# Patient Record
(demographics unavailable — no encounter records)

---

## 2017-03-09 NOTE — ERD
ER Documentation


Chief Complaint


Date/Time


DATE: 3/9/17 


TIME: 15:52


Chief Complaint


constipation; last bm yesterday; no complaint of rectal bleeding





HPI


Is a 38-year-old male who presents to the emergency department today 

complaining of constipation and rectal pain for the past 2 days.  Patient 

states his last bowel movement was 2 days ago.  Denies any nausea vomiting, 

abdominal pain, fevers or chills.  He has not taken any medication for the 

pain.  Denies taking any narcotics currently.





ROS


All systems reviewed and are negative except as per history of present illness.





Medications


Home Meds


Active Scripts


Hydrocortisone Acetate (Anusol-Hc) 25 Mg Supp.rect, 1 SUPP [place] QHS Y for 

HEMORROID PAIN/ITCHING, #12 SUPP.RECT


   Prov:ABI LINN PA-C         3/9/17


Acetaminophen* (Tylophen*) 500 Mg Capsule, 1 CAP PO Q6H Y for PAIN AND OR 

ELEVATED TEMP, #30 CAP


   Prov:ABI LINN PA-C         3/9/17


Naproxen* (Naprosyn*) 500 Mg Tablet, 500 MG PO BID Y for PAIN AND/OR 

INFLAMMATION, #30 TAB


   Prov:ABI LINN PA-C         3/9/17


Polyethylene Glycol* (Miralax*) 17 Gm Powd.pack, 17 GM PO DAILY, #20


   Prov:ABI LINN PA-C         3/9/17


Docusate Sodium* (Colace*) 100 Mg Capsule, 100 MG PO TID, #30 CAP


   Prov:ABI LINN PA-C         3/9/17


Phenol* (Chloraseptic* Spray) 177 Ml [place].pump, 2 SPRAY MT Q2H Y for SORE 

THROAT, #1 BOTTLE


   Prov:DAVID BELL DO         8/13/16


Acetaminophen* (Acetaminophen*) 325 Mg Tablet, 325 MG PO Q4H Y for PAIN AND OR 

ELEVATED TEMP, #20 TAB


   Prov:DAVID BELL DO         8/13/16


Ibuprofen* (Ibuprofen*) 600 Mg Tablet, 600 MG PO Q8, #14 TAB


   Prov:DAVID BELL DO         8/13/16


Loperamide Hcl* (Loperamide Hcl*) 2 Mg Cap, 2 MG PO after diarrhea, #10 CAP


   Prov:DAVID BELL DO         8/13/16


Promethazine w/Codeine* (Phenergan w/Codeine* Syrup) 5 Ml Syrup, 5 ML PO Q4H Y 

for COUGH, #120 ML


   Prov:DAVID BELL DO         8/13/16


Azithromycin* (Zithromax*) 250 Mg Tablet, 250 MG PO .ZPACK AS DIRECTED, #6 TAB


   TAKE 500 MG (2 TABS) THE FIRST DAY THEN 250 MG (1 TAB) DAYS 2-5


   Prov:DAVID BELL DO         8/13/16


Docusate Sodium* (Colace*) 100 Mg Capsule, 100 MG PO TID, #30 CAP


   Prov:MELODY RODRIGUEZ PA-C         1/11/16





Allergies


Allergies:  


Coded Allergies:  


     Penicillins (Verified  Allergy, Unknown, 8/13/16)





PMhx/Soc


History of Surgery:  No (APPENDECTOMY 3 WKS AGO)


Anesthesia Reaction:  No


Hx Neurological Disorder:  No


Hx Respiratory Disorders:  No


Hx Cardiac Disorders:  No


Hx Psychiatric Problems:  No


Hx Miscellaneous Medical Probl:  No


Hx Alcohol Use:  No


Hx Substance Use:  No


Hx Tobacco Use:  No





Physical Exam


Vitals





Vital Signs








  Date Time  Temp Pulse Resp B/P Pulse Ox O2 Delivery O2 Flow Rate FiO2


 


3/9/17 12:44 98.6 89 18 142/88 98   








Physical Exam


Const:     No acute distress


Head:   Atraumatic 


Eyes:    Normal Conjunctiva


ENT:    Normal External Ears, Nose and Mouth.


Neck:               Full range of motion..~ No meningismus.


Resp:    Clear to auscultation bilaterally


Cardio:    Regular rate and rhythm, no murmurs


Abd:    Soft, non tender, non distended. Normal bowel sounds.


:   Rectal exam with evidence of bleeding and external hemorrhoids.  No 

evidence of abscess.


Skin:    No petechiae or rashes


Ext:    No cyanosis, or edema


Neur:    Awake and alert


Psych:    Normal Mood and Affect





Procedures/MDM


This a 38-year-old male who presents to the emergency department today 

complaining of rectal pain and constipation for the past 2 days.  Patient had 

been seen here approximately a little over a year ago for similar complaints 

after he was taking narcotics for an appendectomy.  Patient denies taking any 

narcotics at this time.  Patient has no abdominal pain on physical exam and did 

not feel the patient requires further laboratory workup or imaging at this 

time.  His abdomen is soft and nontender.  He has no nausea or vomiting and 

have low suspicion for obstruction at this time.  Patient did have some 

bleeding on rectal exam and there was evidence of external hemorrhoids.  

Patient was unaware that he had rectal bleeding.  He denied any blood in his 

stool.  There is no evidence of abscess or thrombosed hemorrhoid at this time 

that would require an incision and drainage.  Patient's rectal bleeding likely 

secondary to hemorrhoids and hemorrhoids likely caused by constipation and 

straining.





Patient will be given a prescription for Colace, MiraLAX and Anusol as well as 

Naprosyn and Tylenol for pain





At this time the patient is stable for discharge and outpatient management. 

Patient should follow up with their PCP in the next 1-2 days.  They may return 

to the emergency department sooner for any persistent or worsening of symptoms.

  Patient understood and agreed with the plan.





Departure


Diagnosis:  


 Primary Impression:  


 Constipation


 Constipation type:  unspecified constipation type  Qualified Code:  K59.00 - 

Constipation, unspecified constipation type


 Additional Impression:  


 Hemorrhoids


 Hemorrhoid type:  unspecified  Qualified Code:  K64.9 - Hemorrhoids, 

unspecified hemorrhoid type


Condition:  ABI Villa PA-C Mar 9, 2017 15:56

## 2017-10-13 NOTE — QN
Documentation


Comment


My independent concise history is chest pain and diarrhea.  My pertinent 

physical exam findings are no abnormal findings on physical exam.  The plan is 

laboratory studies, EKG, and chest x-ray were all normal.  The patient will be 

discharged home but will need close follow-up with his primary doctor within 24-

48 hours for reevaluation.  At this point I doubt acute coronary syndrome, 

pneumonia, pneumothorax, pulmonary embolism, or aortic dissection.











CHATO ADAM MD Oct 13, 2017 08:55

## 2017-10-13 NOTE — RADRPT
PROCEDURE:   XR Chest. 

 

CLINICAL INDICATION:    chest pain

 

TECHNIQUE:   Single frontal view of the chest was obtained 

 

COMPARISON:   None 

 

FINDINGS:

The heart and mediastinum are within normal limits.  

The lungs are clear.

There is no pleural effusion or pneumothorax.   

 

RPTAT: AA

 

IMPRESSION:

No acute disease.

_____________________________________________ 

.Andrew Benavidez MD, MD           Date    Time 

Electronically viewed and signed by .Andrew Benavidez MD, MD on 10/13/2017 08:25 

 

D:  10/13/2017 08:25  T:  10/13/2017 08:25

.S/

## 2017-10-13 NOTE — ERD
ER Documentation


Chief Complaint


Date/Time


DATE: 10/13/17 


TIME: 08:17


Chief Complaint


Patient  states he has chest pain and diarrhea since last night





HPI


39-year-old male presenting to the emergency department complaining of 

nonradiating, hot left-sided chest pain that is 7 out of 10 since last night.  

Patient denies any shortness of breath, palpitations.  Denies any medical 

problems or history of heart disease.  Patient also states that he has had 5 

episodes of diarrhea last night.  He denies any fevers, nausea, vomiting.  

Denies any abdominal pain





ROS


All systems reviewed and are negative except as per history of present illness.





Medications


Home Meds


Active Scripts


Acetaminophen* (Tylenol*) 325 Mg Tablet, 2 TAB PO Q6 Y for PAIN AND OR ELEVATED 

TEMP, #20 TAB


   Prov:JEANNE JAIME PA-C         10/13/17


Hydrocortisone Acetate (Anusol-Hc) 25 Mg Supp.rect, 1 SUPP [place] QHS Y for 

HEMORROID PAIN/ITCHING, #12 SUPP.RECT


   Prov:ABI LINN PA-C         3/9/17


Acetaminophen* (Tylophen*) 500 Mg Capsule, 1 CAP PO Q6H Y for PAIN AND OR 

ELEVATED TEMP, #30 CAP


   Prov:ABI LINN PA-C         3/9/17


Naproxen* (Naprosyn*) 500 Mg Tablet, 500 MG PO BID Y for PAIN AND/OR 

INFLAMMATION, #30 TAB


   Prov:ABI LINN PA-C         3/9/17


Polyethylene Glycol* (Miralax*) 17 Gm Powd.pack, 17 GM PO DAILY, #20


   Prov:ABI LINN PA-C         3/9/17


Docusate Sodium* (Colace*) 100 Mg Capsule, 100 MG PO TID, #30 CAP


   Prov:ABI LINN PA-C         3/9/17


Phenol* (Chloraseptic* Spray) 177 Ml [place].pump, 2 SPRAY MT Q2H Y for SORE 

THROAT, #1 BOTTLE


   Prov:DAVID BELL DO         8/13/16


Acetaminophen* (Acetaminophen*) 325 Mg Tablet, 325 MG PO Q4H Y for PAIN AND OR 

ELEVATED TEMP, #20 TAB


   Prov:DAVID BELL DO         8/13/16


Ibuprofen* (Ibuprofen*) 600 Mg Tablet, 600 MG PO Q8, #14 TAB


   Prov:DAVID BELL DO         8/13/16


Loperamide Hcl* (Loperamide Hcl*) 2 Mg Cap, 2 MG PO after diarrhea, #10 CAP


   Prov:DAVID BELL DO         8/13/16


Promethazine w/Codeine* (Phenergan w/Codeine* Syrup) 5 Ml Syrup, 5 ML PO Q4H Y 

for COUGH, #120 ML


   Prov:DAVID BELL DO         8/13/16


Azithromycin* (Zithromax*) 250 Mg Tablet, 250 MG PO .ZPACK AS DIRECTED, #6 TAB


   TAKE 500 MG (2 TABS) THE FIRST DAY THEN 250 MG (1 TAB) DAYS 2-5


   Prov:DAVID BELL DO         8/13/16


Docusate Sodium* (Colace*) 100 Mg Capsule, 100 MG PO TID, #30 CAP


   Prov:MELODY RODRIGUEZ PA-C         1/11/16





Allergies


Allergies:  


Coded Allergies:  


     Penicillins (Verified  Allergy, Unknown, 8/13/16)





PMhx/Soc


History of Surgery:  No (APPENDECTOMY 3 WKS AGO)


Anesthesia Reaction:  No


Hx Neurological Disorder:  No


Hx Respiratory Disorders:  No


Hx Cardiac Disorders:  No


Hx Psychiatric Problems:  No


Hx Miscellaneous Medical Probl:  No


Hx Alcohol Use:  No


Hx Substance Use:  No


Hx Tobacco Use:  No





Physical Exam


Vitals





Vital Signs








  Date Time  Temp Pulse Resp B/P Pulse Ox O2 Delivery O2 Flow Rate FiO2


 


10/13/17 07:18 97.7 88 20 133/97 98   








Physical Exam


GENERAL: no acute distress, non-toxic appearing, sitting up in bed


HENT: normocephalic/atraumatic


EYES: conjunctiva is normal


NECK: no noticeable or palpable swelling, no carotid bruits, no JVD


CARDIOVASCULAR: RRR, good S1S2, no murmurs or gallops heard


PULM: clear to auscultation, no use of accessory muscles, no crackles or 

wheezes. 


ABDOMEN: normal bowel sounds, abdomen soft and nontender 


EXT: no edema, cyanosis or clubbing 


MUSCULOSKELETAL: 5/5 strength, normal range of motion, no swollen or 

erythematous joints. 


NEURO: alert and oriented


SKIN: no rashes, skin warm and dry, no erythematous areas 


BREAST: breast exam was not relevant, therefore not preformed


PSYCH: normal mood and mentation, denies suicidal or homicidal ideation and 

thoughts


Result Diagram:  


10/13/17 0750                                                                  

              10/13/17 0750





Results 24 hrs





 Laboratory Tests








Test


  10/13/17


07:50


 


White Blood Count 7.410^3/ul 


 


Red Blood Count 4.7910^6/ul 


 


Hemoglobin 16.2g/dl 


 


Hematocrit 44.3% 


 


Mean Corpuscular Volume 92.5fl 


 


Mean Corpuscular Hemoglobin 33.8pg 


 


Mean Corpuscular Hemoglobin


Concent 36.6g/dl 


 


 


Red Cell Distribution Width 11.7% 


 


Platelet Count 68151^3/UL 


 


Mean Platelet Volume 10.2fl 


 


Neutrophils % 65.5% 


 


Lymphocytes % 28.4% 


 


Monocytes % 4.4% 


 


Eosinophils % 1.0% 


 


Basophils % 0.3% 


 


Nucleated Red Blood Cells % 0.0/100WBC 


 


Neutrophils # 4.810^3/ul 


 


Lymphocytes # 2.110^3/ul 


 


Monocytes # 0.310^3/ul 


 


Eosinophils # 0.110^3/ul 


 


Basophils # 0.010^3/ul 


 


Nucleated Red Blood Cells # 0.010^3/ul 


 


Sodium Level 139mmol/L 


 


Potassium Level 4.2mmol/L 


 


Chloride Level 102mmol/L 


 


Carbon Dioxide Level 28mmol/L 


 


Anion Gap 13 


 


Blood Urea Nitrogen 13mg/dl 


 


Creatinine 0.82mg/dl 


 


Glucose Level 217mg/dl 


 


Calcium Level 9.4mg/dl 


 


Total Bilirubin 0.6mg/dl 


 


Direct Bilirubin 0.00mg/dl 


 


Indirect Bilirubin 0.6mg/dl 


 


Aspartate Amino Transf


(AST/SGOT) 44IU/L 


 


 


Alanine Aminotransferase


(ALT/SGPT) 112IU/L 


 


 


Alkaline Phosphatase 97IU/L 


 


Troponin I 0.042ng/ml 


 


Total Protein 8.3g/dl 


 


Albumin 4.5g/dl 


 


Globulin 3.80g/dl 


 


Albumin/Globulin Ratio 1.18 











Procedures/MDM


This is a 39-year-old male presenting to the emergency department complaining 

of chest pain and diarrhea since last night.  I have a low suspicion for ACS, 

pulmonary embolism, pneumonia or pleural effusion.  Patient has stable vital 

signs and appears well to be discharged home to follow-up with hIS regular 

physician.  Troponins negative.





EKG: read and signed off by myself and 


Rate/Rhythm:             Normal Sinus Rhythm 82bpm


QRS, ST, T-waves:    No changes consistent w/ acute ischemia


Impression:      No evidence of ischemia or arrhythmia





Chest X-ray 1V Interpreted by me:


Soft Tissue:                                               No acute 

abnormalities


Bones:                                                    No acute abnormalities


Mediastinum/Cardiac Silhouette/Lungs:     No acute abnormalities





Departure


Diagnosis:  


 Primary Impression:  


 Chest pain


 Chest pain type:  unspecified  Qualified Code:  R07.9 - Chest pain, 

unspecified type


 Additional Impression:  


 Diarrhea


Condition:  Stable











JEANNE JAIME PA-C Oct 13, 2017 08:20